# Patient Record
Sex: FEMALE | Race: WHITE | NOT HISPANIC OR LATINO | Employment: UNEMPLOYED | URBAN - METROPOLITAN AREA
[De-identification: names, ages, dates, MRNs, and addresses within clinical notes are randomized per-mention and may not be internally consistent; named-entity substitution may affect disease eponyms.]

---

## 2023-03-01 ENCOUNTER — TELEPHONE (OUTPATIENT)
Dept: SMOKING CESSATION | Facility: CLINIC | Age: 58
End: 2023-03-01
Payer: MEDICARE

## 2024-01-24 PROBLEM — I73.9 PAD (PERIPHERAL ARTERY DISEASE): Status: ACTIVE | Noted: 2024-01-24

## 2024-01-24 PROBLEM — C43.9: Status: ACTIVE | Noted: 2024-01-24

## 2024-02-22 PROBLEM — C44.311 BASAL CELL CARCINOMA OF LATERAL SIDE WALL OF NOSE: Status: ACTIVE | Noted: 2024-02-22

## 2024-05-15 PROBLEM — I10 HTN (HYPERTENSION): Status: ACTIVE | Noted: 2024-05-15

## 2024-06-05 PROBLEM — I25.10 CAD (CORONARY ARTERY DISEASE): Status: ACTIVE | Noted: 2024-06-05

## 2024-06-05 PROBLEM — K55.1 MESENTERIC ARTERY STENOSIS: Status: ACTIVE | Noted: 2024-06-05

## 2024-06-05 PROBLEM — M54.9 BACK PAIN: Status: ACTIVE | Noted: 2024-06-05

## 2024-09-06 ENCOUNTER — SOCIAL WORK (OUTPATIENT)
Dept: ADMINISTRATIVE | Facility: OTHER | Age: 59
End: 2024-09-06
Payer: MEDICARE

## 2024-09-06 NOTE — PROGRESS NOTES
09-06-24 Visit received from patient , requesting assistance with medications.  Patient reports the following: can not afford the medication Brilinta; wanted to know if the medication Plavix is on the medication program.  Patient was encouraged to place a call to [facility] on 09-09-24 regarding this request.  Patient was provided contact information and voiced understanding.  [place] [phone]